# Patient Record
Sex: MALE | Employment: UNEMPLOYED | URBAN - METROPOLITAN AREA
[De-identification: names, ages, dates, MRNs, and addresses within clinical notes are randomized per-mention and may not be internally consistent; named-entity substitution may affect disease eponyms.]

---

## 2020-08-25 ENCOUNTER — TELEPHONE (OUTPATIENT)
Dept: ORTHOPEDIC SURGERY | Age: 80
End: 2020-08-25

## 2020-08-25 ENCOUNTER — OFFICE VISIT (OUTPATIENT)
Dept: ORTHOPEDIC SURGERY | Age: 80
End: 2020-08-25

## 2020-08-25 VITALS
HEIGHT: 71 IN | BODY MASS INDEX: 30.68 KG/M2 | HEART RATE: 80 BPM | DIASTOLIC BLOOD PRESSURE: 76 MMHG | RESPIRATION RATE: 15 BRPM | TEMPERATURE: 97.6 F | SYSTOLIC BLOOD PRESSURE: 151 MMHG

## 2020-08-25 DIAGNOSIS — M54.16 LUMBAR RADICULOPATHY: ICD-10-CM

## 2020-08-25 DIAGNOSIS — Z96.89 SPINAL CORD STIMULATOR STATUS: ICD-10-CM

## 2020-08-25 DIAGNOSIS — G89.4 CHRONIC PAIN SYNDROME: Primary | ICD-10-CM

## 2020-08-25 DIAGNOSIS — M48.062 LUMBAR STENOSIS WITH NEUROGENIC CLAUDICATION: ICD-10-CM

## 2020-08-25 RX ORDER — LORATADINE 10 MG/1
10 TABLET ORAL DAILY
COMMUNITY
Start: 2020-08-12

## 2020-08-25 RX ORDER — ZAFIRLUKAST 20 MG/1
20 TABLET, FILM COATED ORAL 2 TIMES DAILY
COMMUNITY
Start: 2020-04-19 | End: 2022-09-13 | Stop reason: ALTCHOICE

## 2020-08-25 RX ORDER — ESCITALOPRAM OXALATE 20 MG/1
20 TABLET ORAL DAILY
COMMUNITY
Start: 2020-03-17

## 2020-08-25 RX ORDER — NEBIVOLOL 20 MG/1
TABLET ORAL
COMMUNITY
Start: 2019-03-06

## 2020-08-25 RX ORDER — LISINOPRIL 40 MG/1
TABLET ORAL
COMMUNITY
Start: 2019-03-07

## 2020-08-25 RX ORDER — GABAPENTIN 300 MG/1
300 CAPSULE ORAL 3 TIMES DAILY
Qty: 90 CAP | Refills: 1 | Status: SHIPPED | OUTPATIENT
Start: 2020-08-25

## 2020-08-25 RX ORDER — TRAZODONE HYDROCHLORIDE 100 MG/1
TABLET ORAL
COMMUNITY
Start: 2019-02-15

## 2020-08-25 RX ORDER — HYDROCODONE BITARTRATE AND ACETAMINOPHEN 5; 325 MG/1; MG/1
1 TABLET ORAL
COMMUNITY
Start: 2020-08-12

## 2020-08-25 NOTE — LETTER
8/25/20 Patient: Jake Gentile. YOB: 1940 Date of Visit: 8/25/2020 Matti Hubbard MD 
[address] [address] VIA Facsimile: [phone] Dear Matti Hubbard MD, Thank you for referring Mr. Helga Wolf to [address] [place] [address] for evaluation. My notes for this consultation are attached. If you have questions, please do not hesitate to call me. I look forward to following your patient along with you.  
 
 
Sincerely, 
 
Tunde Asher MD

## 2020-08-25 NOTE — PROGRESS NOTES
[address]  [address], [address]  [facility], [address]  Phone: [phone]  Fax: [phone]  INITIAL CONSULTATION  Patient: Payton Raya. MRN: [id number]       SSN: [id number]  YOB: 1940        AGE: 70 y.o. SEX: male  Body mass index is 30.68 kg/m². PCP: Dakota Campos MD  08/25/20    Chief Complaint   Patient presents with    Neck Pain     2nd opinion    Back Pain         HISTORY OF PRESENT ILLNESS, RADIOGRAPHS, and PLAN:       Ms. Linda Glover is seen today at request of Dr. Giovanny Arreola Mr. Linda Glover is a 70-year-old male he has COPD he has a repaired AAA had chronic low back pain. It was not effectively treated with epidural steroids and other injections he is never been on neuropathic's. He is on oxygen dependent COPD patient. Short of breath at baseline. Underwent a trial of spinal cord stimulation that by report sounds to have been minimally effective for his main pain which is his lumbosacral back pain. He underwent permanent placement in January and he finds the pain relief nonexistent for his back with some discomfort from the generator itself. His physical exam demonstrates an elderly gentleman short of breath on supplemental oxygen morbidly obese he has good strength of his EHL tib and hams and quads no clonus Thakkar's Babinski lumbosacral back pain without radiculopathy. Supple neck normal upper extremities. MRI of his lumbar spine demonstrates degenerative scoliosis with lateral recess foraminal stenosis throughout his lumbar spine stable chronic. Assessment plan I discussed the matter at length with him. We have a patient with chronic lumbosacral back pain with severe arthritic changes that is stable without neurologic impact. I do not have any surgical reconstructive effort that he could tolerate given his other medical problems.   With regards to his spinal cord stimulator it sounds like he never had an effective trial.  The stimulator is no more less effective than his trial was by his history. I think he could try different programming and maybe see if they can get some greater effectiveness for his low back but I am uncertain about it in terms of the pain caused by the generator is been appropriately placed the generator could certainly be removed under local anesthetic. But there is no pressing need for her to be altered or changed as I see it. At this time I recommend a pain management effort. I will provide him a small prescription of gabapentin I recommended that with ramp information. He should see his primary care doctor or enter into a pain management program.  I do not provide chronic pain management. At this point I think he is not a surgical candidate and I really have very little to offer him. This dictation was created utilizing voice recognition software. Errors may be present. Past Medical History:   Diagnosis Date    Abdominal pain     Asthma     Chest tightness     Chronic bronchitis ([address]     Community acquired pneumonia     COPD (chronic obstructive pulmonary disease) (HCC)     Edema     Emphysema     Esophageal reflux     Hyperlipidemia     Hypertension     Hyponatremia     Hypothyroidism     Myalgia and myositis     Nocturia     GERI (obstructive sleep apnea)     Renal insufficiency     Sinus tachycardia     SOB (shortness of breath)     Weight decrease     Wheezing        No family history on file. Current Outpatient Medications   Medication Sig Dispense Refill    loratadine (CLARITIN) 10 mg tablet Take 10 mg by mouth daily.  zafirlukast (ACCOLATE) 20 mg tablet Take 20 mg by mouth two (2) times a day.  nebivoloL (BYSTOLIC) 20 mg tablet TAKE 1 TABLET BY MOUTH EVERY DAY      HYDROcodone-acetaminophen (NORCO) 5-325 mg per tablet Take 1 Tab by mouth every six (6) hours as needed.       traZODone (DESYREL) 100 mg tablet TAKE 1 TABLET BY MOUTH AT BEDTIME      lisinopriL (PRINIVIL, ZESTRIL) 40 mg tablet TAKE 1 TABLET BY MOUTH EVERY DAY      escitalopram oxalate (LEXAPRO) 20 mg tablet Take 20 mg by mouth daily.  predniSONE (DELTASONE) 10 mg tablet Take  by mouth daily (with breakfast).  levothyroxine (SYNTHROID) 150 mcg tablet Take  by mouth Daily (before breakfast).  montelukast (SINGULAIR) 10 mg tablet Take 10 mg by mouth daily.  albuterol (PROVENTIL HFA, VENTOLIN HFA) 90 mcg/actuation inhaler Take  by inhalation.  fluticasone-salmeterol (ADVAIR DISKUS) 500-50 mcg/dose diskus inhaler Take 1 Puff by inhalation every twelve (12) hours.  albuterol-ipratropium (DUO-NEB) 2.5 mg-0.5 mg/3 ml nebulizer solution 3 mL by Nebulization route once.  multivitamin (ONE A DAY) tablet Take 1 Tab by mouth daily.  valsartan (DIOVAN) 80 mg tablet Take  by mouth daily.  pantoprazole (PROTONIX) 40 mg tablet Take 40 mg by mouth daily.  hydrochlorothiazide (MICROZIDE) 12.5 mg capsule Take 12.5 mg by mouth daily.  atorvastatin (LIPITOR) 40 mg tablet Take  by mouth daily.  sildenafil citrate (VIAGRA) 100 mg tablet Take 100 mg by mouth as needed.  aspirin delayed-release 81 mg tablet Take  by mouth daily.          No Known Allergies    Past Surgical History:   Procedure Laterality Date    HX TONSILLECTOMY         Past Medical History:   Diagnosis Date    Abdominal pain     Asthma     Chest tightness     Chronic bronchitis (HCC)     Community acquired pneumonia     COPD (chronic obstructive pulmonary disease) (HCC)     Edema     Emphysema     Esophageal reflux     Hyperlipidemia     Hypertension     Hyponatremia     Hypothyroidism     Myalgia and myositis     Nocturia     GERI (obstructive sleep apnea)     Renal insufficiency     Sinus tachycardia     SOB (shortness of breath)     Weight decrease     Wheezing        Social History     Socioeconomic History    Marital status:      Spouse name: Not on file    Number of children: Not on file    Years of education: Not on file    Highest education level: Not on file   Occupational History    Not on file   Social Needs    Financial resource strain: Not on file    Food insecurity     Worry: Not on file     Inability: Not on file    Transportation needs     Medical: Not on file     Non-medical: Not on file   Tobacco Use    Smoking status: Former Smoker     Last attempt to quit: 1997     Years since quittin.8    Smokeless tobacco: Never Used   Substance and Sexual Activity    Alcohol use: Yes    Drug use: No    Sexual activity: Not on file   Lifestyle    Physical activity     Days per week: Not on file     Minutes per session: Not on file    Stress: Not on file   Relationships    Social connections     Talks on phone: Not on file     Gets together: Not on file     Attends Rastafari service: Not on file     Active member of club or organization: Not on file     Attends meetings of clubs or organizations: Not on file     Relationship status: Not on file    Intimate partner violence     Fear of current or ex partner: Not on file     Emotionally abused: Not on file     Physically abused: Not on file     Forced sexual activity: Not on file   Other Topics Concern    Not on file   Social History Narrative    Not on file           REVIEW OF SYSTEMS:   CONSTITUTIONAL SYMPTOMS:  Negative. EYES:  Negative. EARS, NOSE, THROAT AND MOUTH:  Negative. CARDIOVASCULAR:  Negative. RESPIRATORY:  Negative. GENITOURINARY: Per HPI. GASTROINTESTINAL:  Per HPI. INTEGUMENTARY (SKIN AND/OR BREAST):  Negative. MUSCULOSKELETAL: Per HPI.   ENDOCRINE/RHEUMATOLOGIC:  Negative. NEUROLOGICAL:  Per HPI. HEMATOLOGIC/LYMPHATIC:  Negative. ALLERGIC/IMMUNOLOGIC:  Negative. PSYCHIATRIC:  Negative.     PHYSICAL EXAMINATION:   Visit Vitals  /76 (BP 1 Location: Left arm, BP Patient Position: Sitting)   Pulse 80   Temp 97.6 °F (36.4 °C) (Temporal) Resp 15   Ht 5' 11\" (1.803 m)   BMI 30.68 kg/m²    PAIN SCALE: 8/10    CONSTITUTIONAL: The patient is in no apparent distress and is alert and oriented x 3. HEENT: Normocephalic. Hearing grossly intact. NECK: Supple and symmetric. no tenderness, or masses were felt. RESPIRATORY: No labored breathing. CARDIOVASCULAR: The carotid pulses were normal. Peripheral pulses were 2+. CHEST: Normal AP diameter and normal contour without any kyphoscoliosis. LYMPHATIC: No lymphadenopathy was appreciated in the neck, axillae or groin. SKIN:  Negative for scars, rashes, lesions, or ulcers on the right upper, right lower, left upper, left lower and trunk. NEUROLOGICAL: Alert and oriented x 3. Ambulation with walker. DWB. EXTREMITIES:  See musculoskeletal.  MUSCULOSKELETAL:   Head and Neck: Left-sided neck tightness. Negative for misalignment, asymmetry, crepitation, defects, tenderness masses or effusions.  Left Upper Extremity: Inspection, percussion and palpation performed. Thakkars sign is negative.  Right Upper Extremity: Inspection, percussion and palpation performed. Thakkars sign is negative.  Spine, Ribs and Pelvis: Bilateral low back pain. Inspection, percussion and palpation performed. Negative for misalignment, asymmetry, crepitation, defects, tenderness masses or effusions.  Left Lower Extremity: Leg spasms. Inspection, percussion and palpation performed. Negative straight leg raise.  Right Lower Extremity: Leg spasms. Inspection, percussion and palpation performed. Negative straight leg raise. SPINE EXAM:     Cervical spine: Neck is midline. Normal muscle tone. No focal atrophy is noted. Lumbar spine: No rash, ecchymosis, or gross obliquity. No focal atrophy is noted. ASSESSMENT    ICD-10-CM ICD-9-CM    1. Chronic pain syndrome  G89.4 338.4 gabapentin (NEURONTIN) 300 mg capsule   2. Spinal cord stimulator status  Z96.89 V45.89 gabapentin (NEURONTIN) 300 mg capsule   3. Lumbar stenosis with neurogenic claudication  M48.062 724.03 gabapentin (NEURONTIN) 300 mg capsule   4. Lumbar radiculopathy  M54.16 724.4 gabapentin (NEURONTIN) 300 mg capsule       Written by Earl Brizuela, as dictated by Roma Wilson MD.    I, Dr. Roma Wilson MD, confirm that all documentation is accurate.

## 2020-08-25 NOTE — TELEPHONE ENCOUNTER
Patients wife if requesting we send rx for Gabapentin discussed at today's appointment to [facility] pharmacy on file.

## 2020-08-25 NOTE — TELEPHONE ENCOUNTER
I called and spoke to . Maria Victoria Brandt. The pt was identified using 2 pt identifiers. He was informed that his prescription for gabapentin was sent over to his [facility] on file at 0927 this morning. The pt verbalized understanding and states that they have just contacted him to let him know that it is ready for .

## 2022-09-06 ENCOUNTER — OFFICE VISIT (OUTPATIENT)
Dept: PULMONOLOGY | Age: 82
End: 2022-09-06
Payer: MEDICARE

## 2022-09-06 VITALS — BODY MASS INDEX: 36.4 KG/M2 | WEIGHT: 260 LBS | HEIGHT: 71 IN

## 2022-09-06 DIAGNOSIS — J44.9 CHRONIC OBSTRUCTIVE PULMONARY DISEASE, UNSPECIFIED COPD TYPE (HCC): Primary | ICD-10-CM

## 2022-09-06 PROCEDURE — 94060 EVALUATION OF WHEEZING: CPT | Performed by: INTERNAL MEDICINE

## 2022-09-13 ENCOUNTER — OFFICE VISIT (OUTPATIENT)
Dept: PULMONOLOGY | Age: 82
End: 2022-09-13
Payer: MEDICARE

## 2022-09-13 VITALS
SYSTOLIC BLOOD PRESSURE: 140 MMHG | TEMPERATURE: 97.6 F | BODY MASS INDEX: 35 KG/M2 | DIASTOLIC BLOOD PRESSURE: 70 MMHG | RESPIRATION RATE: 18 BRPM | WEIGHT: 250 LBS | OXYGEN SATURATION: 98 % | HEIGHT: 71 IN | HEART RATE: 71 BPM

## 2022-09-13 DIAGNOSIS — Z99.89 OSA ON CPAP: ICD-10-CM

## 2022-09-13 DIAGNOSIS — G47.33 OSA ON CPAP: ICD-10-CM

## 2022-09-13 DIAGNOSIS — J96.11 CHRONIC RESPIRATORY FAILURE WITH HYPOXIA (HCC): ICD-10-CM

## 2022-09-13 DIAGNOSIS — J44.9 STAGE 4 VERY SEVERE COPD BY GOLD CLASSIFICATION (HCC): Primary | ICD-10-CM

## 2022-09-13 DIAGNOSIS — J44.9 COPD WITH ASTHMA (HCC): ICD-10-CM

## 2022-09-13 PROBLEM — E66.9 CLASS 1 OBESITY IN ADULT: Status: ACTIVE | Noted: 2022-09-13

## 2022-09-13 PROCEDURE — G8536 NO DOC ELDER MAL SCRN: HCPCS | Performed by: INTERNAL MEDICINE

## 2022-09-13 PROCEDURE — 99205 OFFICE O/P NEW HI 60 MIN: CPT | Performed by: INTERNAL MEDICINE

## 2022-09-13 PROCEDURE — G8427 DOCREV CUR MEDS BY ELIG CLIN: HCPCS | Performed by: INTERNAL MEDICINE

## 2022-09-13 PROCEDURE — G8417 CALC BMI ABV UP PARAM F/U: HCPCS | Performed by: INTERNAL MEDICINE

## 2022-09-13 PROCEDURE — 1101F PT FALLS ASSESS-DOCD LE1/YR: CPT | Performed by: INTERNAL MEDICINE

## 2022-09-13 PROCEDURE — 1123F ACP DISCUSS/DSCN MKR DOCD: CPT | Performed by: INTERNAL MEDICINE

## 2022-09-13 PROCEDURE — G8432 DEP SCR NOT DOC, RNG: HCPCS | Performed by: INTERNAL MEDICINE

## 2022-09-13 RX ORDER — AZITHROMYCIN 250 MG/1
TABLET, FILM COATED ORAL
COMMUNITY
Start: 2022-07-12

## 2022-09-13 RX ORDER — BUMETANIDE 1 MG/1
1 TABLET ORAL 2 TIMES DAILY
COMMUNITY
Start: 2022-08-05

## 2022-09-13 RX ORDER — PAROXETINE HYDROCHLORIDE 20 MG/1
20 TABLET, FILM COATED ORAL DAILY
COMMUNITY
Start: 2022-02-21

## 2022-09-13 RX ORDER — TIOTROPIUM BROMIDE 18 UG/1
CAPSULE ORAL; RESPIRATORY (INHALATION)
COMMUNITY
Start: 2022-07-24 | End: 2022-09-13 | Stop reason: ALTCHOICE

## 2022-09-13 RX ORDER — FLUTICASONE FUROATE, UMECLIDINIUM BROMIDE AND VILANTEROL TRIFENATATE 200; 62.5; 25 UG/1; UG/1; UG/1
1 POWDER RESPIRATORY (INHALATION) DAILY
Qty: 2 EACH | Refills: 0 | Status: SHIPPED | COMMUNITY
Start: 2022-09-13

## 2022-09-13 NOTE — PROGRESS NOTES
[facility]  Pulmonary, Critical Care, and Sleep Medicine      Pulmonary Office Initial referral report    Name: Anjel Ferrell.     : 1940     Date: 2022        Subjective:   Patient has been referred for evaluation of: Severe COPD, chronic respiratory failure, obstructive sleep apnea. Patient is a 80 y.o. male has been previously followed by pulmonologist in [place] and sleep specialists in [place] and now is seeking evaluation for physician closer to his home. History has been obtained from the patient and his wife-patient was initially diagnosed with COPD and placed on supplemental oxygen around  when he was hospitalized for pulmonary problems. He was also diagnosed with sleep apnea and has been on CPAP therapy at nighttime with supplemental oxygen in the daytime at 4 L nasal cannula. His DME vendor is [address] in [place]. Patient states that he is severely limited in his activities of daily living due to multiple factors including immobility from severe arthritis, complains of severe shortness of breath with minimal movement and over the years significant deconditioning from weight gain and inactivity. He has been on Advair 500/50 and Spiriva and has been finding that it is not controlling his symptoms. In addition he has been on chronic prednisone 5 mg daily and Zithromax . Previously he was on Daliresp which was discontinued by his previous pulmonologist.  He keeps constant symptoms of cough with some mucus production, not colored and no complaints of hemoptysis. He has constant wheezing but predominant symptom is shortness of breath. Intermittently he has some pedal edema  He has no difficulty wearing his CPAP but needs to use a small fullface mask which has been the most appropriate fit for him. He does have a nebulizer at home and uses it as needed.      1 2 3 4 5   Cough  2      Mucus  2      Chest tightness  2 ADL's     5   Climb 1 flight stairs     5   Sleep    4    Energy level    4      Scale 1   2  3  4  5  Never to all the time    CAT> 10     Comorbid conditions include- GERI, Sleep disordered breathing, asthma in childhood, obesity  Smoking status: Ex-smoker. 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 ILD history:  No Hx of connective tissue disease such as RA, Lupus, Scleroderma  No Hx Raynauds  No Hx sarcoidosis  No Hx taking medications- methotrexate, Amiodarone, Nitrofurantoin  No Hx cancer, chemotherapy, radiation  NoHx Birds, chickens, farm animals  No Hx using hair spray  Hx possible asbestos exposure, no coal mining, textile industry work, construction work, positive for exposure to chemicals  Hx smoking  NoHx TB/positive PPD  No Hx covid-19 pneumonia       Review of data:  I have personally reviewed all data-clinical encounters, imaging, outside test results pertinent to patient's care.   Testing:  CXR  CT scan  PFT  Echo  6 min walk  Sleep studies    DME: [employer]  Oxygen  Nebulizer  PAP device      Past Medical History:   Diagnosis Date    Abdominal pain     Asthma     Chest tightness     Chronic bronchitis (HCC)     Community acquired pneumonia     COPD (chronic obstructive pulmonary disease) (HCC)     Edema     Emphysema     Esophageal reflux     Hyperlipidemia     Hypertension     Hyponatremia     Hypothyroidism     Myalgia and myositis     Nocturia     GERI (obstructive sleep apnea)     Renal insufficiency     Sinus tachycardia     SOB (shortness of breath)     Weight decrease     Wheezing        Past Surgical History:   Procedure Laterality Date    HX TONSILLECTOMY         Social History     Socioeconomic History    Marital status:    Tobacco Use    Smoking status: Former     Packs/day: 1.00     Years: 20.00     Pack years: 20.00     Types: Cigarettes     Quit date: 1997     Years since quittin.8    Smokeless tobacco: Never   Substance and Sexual Activity    Alcohol use: Yes    Drug use: No       Family history-unremarkable for any emphysema/COPD    No Known Allergies    . Current Outpatient Medications   Medication Sig Dispense Refill    loratadine (CLARITIN) 10 mg tablet Take 10 mg by mouth daily. zafirlukast (ACCOLATE) 20 mg tablet Take 20 mg by mouth two (2) times a day. nebivoloL (BYSTOLIC) 20 mg tablet TAKE 1 TABLET BY MOUTH EVERY DAY      HYDROcodone-acetaminophen (NORCO) 5-325 mg per tablet Take 1 Tab by mouth every six (6) hours as needed. traZODone (DESYREL) 100 mg tablet TAKE 1 TABLET BY MOUTH AT BEDTIME      lisinopriL (PRINIVIL, ZESTRIL) 40 mg tablet TAKE 1 TABLET BY MOUTH EVERY DAY      escitalopram oxalate (LEXAPRO) 20 mg tablet Take 20 mg by mouth daily. gabapentin (NEURONTIN) 300 mg capsule Take 1 Cap by mouth three (3) times daily. Max Daily Amount: 900 mg. 90 Cap 1    predniSONE (DELTASONE) 10 mg tablet Take  by mouth daily (with breakfast). levothyroxine (SYNTHROID) 150 mcg tablet Take  by mouth Daily (before breakfast). montelukast (SINGULAIR) 10 mg tablet Take 10 mg by mouth daily. albuterol (PROVENTIL HFA, VENTOLIN HFA) 90 mcg/actuation inhaler Take  by inhalation. fluticasone-salmeterol (ADVAIR DISKUS) 500-50 mcg/dose diskus inhaler Take 1 Puff by inhalation every twelve (12) hours. albuterol-ipratropium (DUO-NEB) 2.5 mg-0.5 mg/3 ml nebulizer solution 3 mL by Nebulization route once. multivitamin (ONE A DAY) tablet Take 1 Tab by mouth daily. valsartan (DIOVAN) 80 mg tablet Take  by mouth daily. pantoprazole (PROTONIX) 40 mg tablet Take 40 mg by mouth daily. hydrochlorothiazide (MICROZIDE) 12.5 mg capsule Take 12.5 mg by mouth daily. atorvastatin (LIPITOR) 40 mg tablet Take  by mouth daily.       sildenafil citrate (VIAGRA) 100 mg tablet Take 100 mg by mouth as needed. aspirin delayed-release 81 mg tablet Take  by mouth daily.            Review of Systems:  HEENT: No epistaxis, no nasal drainage, no difficulty in swallowing, no redness in eyes  Respiratory: as above  Cardiovascular: no chest pain, no palpitations, no chronic leg edema, no syncope  Gastrointestinal: no abd pain, no vomiting, no diarrhea, no bleeding symptoms  Genitourinary: No urinary symptoms or hematuria  Integument/breast: No ulcers or rashes  Musculoskeletal:Neg  Neurological: No focal weakness, no seizures, no headaches  Behvioral/Psych: No anxiety, no depression  Constitutional: No fever, no chills, no weight loss, no night sweats     Objective:   Visit Vitals  BP (!) 153/60 (BP 1 Location: Right upper arm, BP Patient Position: Sitting, BP Cuff Size: Large adult)   Pulse 71   Temp 97.6 °F (36.4 °C) (Oral)   Resp 18   Ht 5' 11\" (1.803 m)   Wt 113.4 kg (250 lb)   SpO2 98% Comment: 4 LPM   BMI 34.87 kg/m²        Physical Exam:   General: comfortable, no acute distress, sitting in wheelchair  HEENT: pupils reactive, sclera anicteric, EOM intact  Neck: No adenopathy or thyroid swelling, no lymphadenopathy or JVD, supple  CVS: S1S2 no murmurs  RS: Decreased AE bilaterally, no tactile fremitus or egophony, no accessory muscle use, scattered rhonchi  Abd: soft, non tender, no hepatosplenomegaly  Neuro: non focal, awake, alert  Extrm: no leg edema, clubbing or cyanosis, left upper extremity AV shunt with good thrill felt  Skin: no rash    Data review:   Pertinent labs: CBC, BMP, LFT's    PFT:  Pulmonary Function Test     09/08/22   Patient effort:   Good   Meets ATS criteria for interpretation     Flows:     FVC is reduced to 72% predicted   Maximal Mid Expiratory Flow rate is reduced to 12 % predicted   Forced Expiratory Volume in one second is reduced to 30 % predicted   FEV 1% is reduced     Flow VolumeNo significant improvement with bronchodilator therapy Loop:   Nonspecific obstructive pattern in Flow Volume Loop     Bronchodilator:   No significant response to bronchodilators. Impression:   Severe obstructive defect    2012:  Pulmonary function test:      Spirometry is consistent with an obstructive defect of a severe degree (Gold stage 3 COPD). The FEV1 is reduced at 1.59 liters which is 49% of predicted. The forced vital capacity is reduced at 2.98 liters which is 67% of predicted. The FEV1/FVC ratio is reduced at 54 (72% of predicted). There is a significant response to a single dose of bronchodilator therapy to both the FEV1 and forced vital capacity with improvement in the FEV1 to 1.89 liters which is 58% of predicted (18% change), and forced vital capacity of 3.47 liters which is 78% of predicted (16% change). The maximum voluntary ventilation is severely reduced at 62 liters per minute (48% of predicted). Lung volumes demonstrate a normal total lung capacity at 5.79 liters which is 82% of predicted. The residual volume is reduced at 1.34 liters which is 54% of predicted. The functional residual capacity is normal at 2.78 liters which is 74% of predicted. Diffusion capacity is reduced for uncorrected hemoglobin as well as alveolar volume. INTERPRETATION:       1. Severe obstructive airways disease (Gold stage 3). 2. Evidence of airway reversibility with a single dose of bronchodilator therapy. 3. Abnormal diffusion capacity indicating disease of the terminal alveolar unit. 4. Normal lung volumes. Simple stress test:      Patient underwent a six minute walk study on room air. The studies are technically well performed and adequate for interpretation. The patient's resting SPO2 was 95% with a heart rate of 82 beats per minute and blood pressure of 125/84. With ambulation, the lowest saturation recorded was 93%. Maximal heart rate achieved was 113 beats per minute.  There was no significant change with blood pressure during exercise activity. Post Daniel dyspnea score was 5 with an exertion score of 16. Total distance ambulated was 900 feet. The patient paused during the study. INTERPRETATION:       1. No evidence of exercise induced hypoxemia. 2. Total distance ambulated was 900 feet.         _____________________________________________    Pulmonologist: Callie Mccarty MD      Date Dictated: 12/04/2012    No results found for this or any previous visit. Imaging:  I have personally reviewed the patients radiographs and have reviewed the reports:  XR Results (most recent):  No results found for this or any previous visit. [facility]-7/8/2022  FINDINGS:     HEART AND MEDIASTINUM: No appreciable cardiomegaly. Remaining mediastinal contours within normal limits. LUNGS AND PLEURAL SPACES: Bilateral centrilobular emphysema. No consolidation, mass, or pleural effusion. BONY THORAX AND SOFT TISSUES: Thoracic spondylosis   CT Results (most recent):  No results found for this or any previous visit. Patient Active Problem List   Diagnosis Code    Elevated PSA R97.20     Assessment:  COPD GOLD-D  CAT score-24  KRYSTAL index-adequate    IMPRESSION:     COPD, group D, by GOLD 2017 classification ([facility] [address] PFT from 2012-FEV1 49% predicted with bronchodilator response, reduced diffusion capacity. Most recent PFT from September 2022-FVC is reduced to 72% predicted   Maximal Mid Expiratory Flow rate is reduced to 12 % predicted   Forced Expiratory Volume in one second is reduced to 30 % predicted   FEV 1% is reduced   Chronic respiratory failure with hypoxia (HCC) currently on supplemental oxygen at 4 L  GERI on CPAP  Ex- smoker- quit 25 years back  CKD- 4-has AV graft/shunt in left arm but not yet started on dialysis      RECOMMENDATIONS:   Discussed with patient and wife extensively about current pulmonary function, possible factors that can be addressed to reverse further functional loss/maintain function.   Severely reduced FEV1-stage IV COPD, Gold functional class D on supplemental oxygen is currently on multi targeted therapy which can be tweaked as follows  Will start Trelegy as single agent for maintenance and continue with current prednisone 5 mg daily as well as Zithromax Monday Wednesday Friday until further response is noted. Will consider weaning chronic steroids and antibiotics if further gain is achieved  Can consider checking absolute eosinophils, IgE and add Biologics as add-on therapy with known history of asthma although significant remodeling has led to fixed airways obstruction and COPD  Continue supplemental oxygen at 4 L  Continue CPAP-we will order supplies through [employer] as needed  Consider checking ABG and new sleep study to see if patient needs bilevel support/NIV support  Patient may be a candidate for life 2000 device  Consider rechecking echocardiogram and add additional therapeutic recommendations if needed  Preventive vaccinations  Early interventions for any exacerbations  Strongly consider weight loss interventions to include referral to medical weight loss-Will defer to primary provider  Questions concerns addressed and will continue to follow for high complexity chronic medical conditions     Health maintenance screens deferred to Primary care provider. Rickey Murray MD    This patient has a high complexity chronic care condition   This Visit needed High complexity medically necessary decision making and management plans. Time spent in preparing for the visit-review of history, tests done prior to arrival, additional time reviewing clinical data, imaging, outside records and test results as well as time spent in ordering tests, treatments and referring patient for further care was a total of 20 minutes. Additional time-counseling with patient and family members regarding care plan 25 minutes.     Please note that this dictation was completed with [employer], the computer voice recognition software. Quite often unanticipated grammatical, syntax, homophones, and other interpretive errors are inadvertently transcribed by the computer software. Please disregard these errors. Please excuse any errors that have escaped final proofreading.

## 2022-09-13 NOTE — LETTER
9/14/2022    Patient: Anitra Foy. YOB: 1940   Date of Visit: 9/13/2022     Mesha Paulson MD  [address] [facility] [address] \[facility] [place] [facility]\"" [id number]  Via Fax: [phone]    Dear Mesha Paulson MD,      Thank you for referring Mr. Morgan Cortez to [address] [place] [address] for evaluation. My notes for this consultation are attached. If you have questions, please do not hesitate to call me. I look forward to following your patient along with you.       Sincerely,    Quin Herrera MD

## 2022-09-13 NOTE — PROGRESS NOTES
Damian Ceballos presents today for   Chief Complaint   Patient presents with    Cough with sputum    Sleep Apnea    Wheezing    Shortness of Breath       Is someone accompanying this pt? Wife    Is the patient using any DME equipment during OV? Oxygen     -D[employer]    Depression Screening:  3 most recent PHQ Screens 8/25/2020   Little interest or pleasure in doing things Not at all   Feeling down, depressed, irritable, or hopeless Not at all   Total Score PHQ 2 0       Learning Assessment:  Learning Assessment 9/13/2022   PRIMARY LEARNER Patient   PRIMARY LANGUAGE ENGLISH   LEARNER PREFERENCE PRIMARY DEMONSTRATION   RELATIONSHIP SELF       Abuse Screening:  No flowsheet data found. Fall Risk  No flowsheet data found. Coordination of Care:  1. Have you been to the ER, urgent care clinic since your last visit? Hospitalized since your last visit? No    2. Have you seen or consulted any other health care providers outside of the [address] [place] [address] since your last visit? Include any pap smears or colon screening.  Dr Chema Rose  PCP

## 2022-09-21 ENCOUNTER — TELEPHONE (OUTPATIENT)
Dept: PULMONOLOGY | Age: 82
End: 2022-09-21

## 2022-09-21 DIAGNOSIS — J96.11 CHRONIC RESPIRATORY FAILURE WITH HYPOXIA (HCC): Primary | ICD-10-CM

## 2022-09-21 NOTE — TELEPHONE ENCOUNTER
Spoke with patient. He feels his sob and ankle swelling has gotten worse since he saw Dr. Brooklynn Plaza 9/13/22. He was started on Trelegy from visit and not sure if that is what is making him feel worse or not.

## 2022-09-21 NOTE — TELEPHONE ENCOUNTER
Patient spouse needs to speak with someone regarding side affects of Trelegy that the pt is experiencing. Edema in foot, hard time breathing.  Contact pt wife Kylie Stvoer @[phone]

## 2022-09-22 NOTE — TELEPHONE ENCOUNTER
I will order chest x-ray-more concerned about patient developing pulmonary edema since he has had worsening renal function.   Less likely Trelegy causing edema

## 2022-09-23 ENCOUNTER — HOSPITAL ENCOUNTER (OUTPATIENT)
Dept: GENERAL RADIOLOGY | Age: 82
Discharge: HOME | End: 2022-09-23
Payer: MEDICARE

## 2022-09-23 DIAGNOSIS — J96.11 CHRONIC RESPIRATORY FAILURE WITH HYPOXIA (HCC): ICD-10-CM

## 2022-09-23 PROCEDURE — 71046 X-RAY EXAM CHEST 2 VIEWS: CPT

## 2022-09-23 NOTE — TELEPHONE ENCOUNTER
Reviewed chest x-ray  Compared to previous x-ray has opacification of left lower lobe-likely pleural effusion  Called patient and discussed symptoms  He has cut his Bumex back to 1 mg once a day  I asked him to go back to taking 1 mg twice daily and follow-up with nephrology for further guidance  Will monitor response  He will notify should there be any change  For now we will continue Trelegy  Continue supplemental oxygen